# Patient Record
Sex: MALE | Race: WHITE | NOT HISPANIC OR LATINO | Employment: OTHER | URBAN - NONMETROPOLITAN AREA
[De-identification: names, ages, dates, MRNs, and addresses within clinical notes are randomized per-mention and may not be internally consistent; named-entity substitution may affect disease eponyms.]

---

## 2018-08-08 ENCOUNTER — HOSPITAL ENCOUNTER (EMERGENCY)
Facility: HOSPITAL | Age: 53
Discharge: ANOTHER HOSPITAL | End: 2018-08-08
Attending: PHYSICIAN ASSISTANT | Admitting: PHYSICIAN ASSISTANT
Payer: OTHER MISCELLANEOUS

## 2018-08-08 ENCOUNTER — APPOINTMENT (OUTPATIENT)
Dept: CT IMAGING | Facility: HOSPITAL | Age: 53
End: 2018-08-08
Attending: PHYSICIAN ASSISTANT
Payer: OTHER MISCELLANEOUS

## 2018-08-08 ENCOUNTER — APPOINTMENT (OUTPATIENT)
Dept: RADIOLOGY | Facility: HOSPITAL | Age: 53
End: 2018-08-08
Attending: PHYSICIAN ASSISTANT
Payer: OTHER MISCELLANEOUS

## 2018-08-08 VITALS
TEMPERATURE: 98.1 F | RESPIRATION RATE: 16 BRPM | BODY MASS INDEX: 30.39 KG/M2 | OXYGEN SATURATION: 92 % | HEIGHT: 73 IN | WEIGHT: 229.28 LBS | DIASTOLIC BLOOD PRESSURE: 75 MMHG | SYSTOLIC BLOOD PRESSURE: 136 MMHG | HEART RATE: 75 BPM

## 2018-08-08 DIAGNOSIS — S62.91XA FRACTURE OF RIGHT HAND: Primary | ICD-10-CM

## 2018-08-08 DIAGNOSIS — S63.054A DISLOCATION OF CARPOMETACARPAL JOINT OF RIGHT HAND, INITIAL ENCOUNTER: ICD-10-CM

## 2018-08-08 LAB
ANION GAP SERPL CALC-SCNC: 9 MMOL/L (ref 3–11)
BASOPHILS # BLD AUTO: 0.1 10*3/UL
BASOPHILS NFR BLD AUTO: 0 % (ref 0–2)
BUN SERPL-MCNC: 18 MG/DL (ref 7–25)
CALCIUM SERPL-MCNC: 9 MG/DL (ref 8.6–10.3)
CHLORIDE SERPL-SCNC: 103 MMOL/L (ref 98–107)
CO2 SERPL-SCNC: 27 MMOL/L (ref 21–32)
CREAT SERPL-MCNC: 1.1 MG/DL (ref 0.8–1.3)
EOSINOPHIL # BLD AUTO: 0.3 10*3/UL
EOSINOPHIL NFR BLD AUTO: 2 % (ref 0–3)
ERYTHROCYTE [DISTWIDTH] IN BLOOD BY AUTOMATED COUNT: 13.2 % (ref 11.5–15)
GFR SERPL CREATININE-BSD FRML MDRD: 70 ML/MIN/1.73M*2
GLUCOSE SERPL-MCNC: 124 MG/DL (ref 70–105)
HCT VFR BLD AUTO: 42 % (ref 38–50)
HGB BLD-MCNC: 14 G/DL (ref 13.2–17.2)
LYMPHOCYTES # BLD AUTO: 2.1 10*3/UL
LYMPHOCYTES NFR BLD AUTO: 14 % (ref 15–47)
MCH RBC QN AUTO: 29.9 PG (ref 29–34)
MCHC RBC AUTO-ENTMCNC: 33.3 G/DL (ref 32–36)
MCV RBC AUTO: 89.9 FL (ref 82–97)
MONOCYTES # BLD AUTO: 0.8 10*3/UL
MONOCYTES NFR BLD AUTO: 6 % (ref 5–13)
NEUTROPHILS # BLD AUTO: 11.6 10*3/UL
NEUTROPHILS NFR BLD AUTO: 78 % (ref 46–70)
PLATELET # BLD AUTO: 301 10*3/UL (ref 130–350)
PMV BLD AUTO: 7.4 FL (ref 6.9–10.8)
POTASSIUM SERPL-SCNC: 3.7 MMOL/L (ref 3.5–5.1)
RBC # BLD AUTO: 4.67 10*6/ΜL (ref 4.1–5.8)
SODIUM SERPL-SCNC: 139 MMOL/L (ref 135–145)
WBC # BLD AUTO: 14.8 10*3/UL (ref 3.7–9.6)

## 2018-08-08 PROCEDURE — 99284 EMERGENCY DEPT VISIT MOD MDM: CPT | Mod: GF | Performed by: PHYSICIAN ASSISTANT

## 2018-08-08 PROCEDURE — A0428 BLS: HCPCS | Mod: HH,QN

## 2018-08-08 PROCEDURE — 80048 BASIC METABOLIC PNL TOTAL CA: CPT | Performed by: PHYSICIAN ASSISTANT

## 2018-08-08 PROCEDURE — A0427 ALS1-EMERGENCY: HCPCS | Mod: QN,SH

## 2018-08-08 PROCEDURE — A0427 ALS1-EMERGENCY: HCPCS | Mod: SH,QN

## 2018-08-08 PROCEDURE — A0390 ADVANCED LIFE SUPPORT MILEAG: HCPCS | Mod: QN,SH

## 2018-08-08 PROCEDURE — A0390 ADVANCED LIFE SUPPORT MILEAG: HCPCS | Mod: SH,QN

## 2018-08-08 PROCEDURE — 85025 COMPLETE CBC W/AUTO DIFF WBC: CPT | Performed by: PHYSICIAN ASSISTANT

## 2018-08-08 PROCEDURE — 73110 X-RAY EXAM OF WRIST: CPT | Mod: RT

## 2018-08-08 PROCEDURE — 99284 EMERGENCY DEPT VISIT MOD MDM: CPT | Performed by: PHYSICIAN ASSISTANT

## 2018-08-08 PROCEDURE — 99285 EMERGENCY DEPT VISIT HI MDM: CPT | Performed by: PHYSICIAN ASSISTANT

## 2018-08-08 PROCEDURE — 6370000100 HC RX 637 (ALT 250 FOR IP): Performed by: PHYSICIAN ASSISTANT

## 2018-08-08 PROCEDURE — 36415 COLL VENOUS BLD VENIPUNCTURE: CPT | Performed by: PHYSICIAN ASSISTANT

## 2018-08-08 PROCEDURE — 2500000200 HC RX 250 WO HCPCS: Performed by: PHYSICIAN ASSISTANT

## 2018-08-08 PROCEDURE — 71045 X-RAY EXAM CHEST 1 VIEW: CPT

## 2018-08-08 PROCEDURE — 96376 TX/PRO/DX INJ SAME DRUG ADON: CPT

## 2018-08-08 PROCEDURE — 6360000200 HC RX 636 W HCPCS (ALT 250 FOR IP)

## 2018-08-08 PROCEDURE — 6360000200 HC RX 636 W HCPCS (ALT 250 FOR IP): Performed by: PHYSICIAN ASSISTANT

## 2018-08-08 PROCEDURE — 96374 THER/PROPH/DIAG INJ IV PUSH: CPT

## 2018-08-08 PROCEDURE — 2580000300 HC RX 258: Performed by: PHYSICIAN ASSISTANT

## 2018-08-08 PROCEDURE — 96375 TX/PRO/DX INJ NEW DRUG ADDON: CPT

## 2018-08-08 PROCEDURE — 70450 CT HEAD/BRAIN W/O DYE: CPT

## 2018-08-08 PROCEDURE — A0380 BASIC LIFE SUPPORT MILEAGE: HCPCS | Mod: HH,QN

## 2018-08-08 RX ORDER — BACITRACIN 500 [USP'U]/G
OINTMENT OPHTHALMIC
Status: DISCONTINUED
Start: 2018-08-08 | End: 2018-08-08 | Stop reason: WASHOUT

## 2018-08-08 RX ORDER — FENTANYL CITRATE/PF 50 MCG/ML
50 PLASTIC BAG, INJECTION (ML) INTRAVENOUS
Status: DISCONTINUED | OUTPATIENT
Start: 2018-08-08 | End: 2018-08-09 | Stop reason: HOSPADM

## 2018-08-08 RX ORDER — BACITRACIN ZINC 500 UNIT/G
OINTMENT (GRAM) TOPICAL
Status: DISCONTINUED
Start: 2018-08-08 | End: 2018-08-09 | Stop reason: HOSPADM

## 2018-08-08 RX ORDER — CYCLOBENZAPRINE HCL 10 MG
10 TABLET ORAL ONCE
Status: COMPLETED | OUTPATIENT
Start: 2018-08-08 | End: 2018-08-08

## 2018-08-08 RX ORDER — SODIUM CHLORIDE 9 MG/ML
1000 INJECTION, SOLUTION INTRAVENOUS ONCE
Status: COMPLETED | OUTPATIENT
Start: 2018-08-08 | End: 2018-08-08

## 2018-08-08 RX ORDER — BACITRACIN ZINC 500 UNIT/G
1 OINTMENT (GRAM) TOPICAL 2 TIMES DAILY
Status: DISCONTINUED | OUTPATIENT
Start: 2018-08-08 | End: 2018-08-09 | Stop reason: HOSPADM

## 2018-08-08 RX ORDER — CEFAZOLIN SODIUM 1 G/3ML
INJECTION, POWDER, FOR SOLUTION INTRAMUSCULAR; INTRAVENOUS
Status: COMPLETED
Start: 2018-08-08 | End: 2018-08-08

## 2018-08-08 RX ADMIN — SODIUM CHLORIDE 1000 ML: 9 INJECTION, SOLUTION INTRAVENOUS at 21:37

## 2018-08-08 RX ADMIN — SODIUM CHLORIDE 1000 ML: 9 INJECTION, SOLUTION INTRAVENOUS at 22:55

## 2018-08-08 RX ADMIN — FENTANYL CITRATE 50 MCG: 50 INJECTION, SOLUTION INTRAMUSCULAR; INTRAVENOUS at 21:33

## 2018-08-08 RX ADMIN — FENTANYL CITRATE 50 MCG: 50 INJECTION, SOLUTION INTRAMUSCULAR; INTRAVENOUS at 20:17

## 2018-08-08 RX ADMIN — Medication 1 APPLICATION: at 22:35

## 2018-08-08 RX ADMIN — CYCLOBENZAPRINE HYDROCHLORIDE 10 MG: 10 TABLET, FILM COATED ORAL at 22:21

## 2018-08-08 RX ADMIN — CEFAZOLIN SODIUM 2000 MG: 1 INJECTION, POWDER, FOR SOLUTION INTRAMUSCULAR; INTRAVENOUS at 22:29

## 2018-08-08 RX ADMIN — FENTANYL CITRATE 50 MCG: 50 INJECTION, SOLUTION INTRAMUSCULAR; INTRAVENOUS at 22:14

## 2018-08-08 ASSESSMENT — ENCOUNTER SYMPTOMS
CONSTITUTIONAL NEGATIVE: 1
TREMORS: 0
RESPIRATORY NEGATIVE: 1
ADENOPATHY: 0
CONFUSION: 1
CARDIOVASCULAR NEGATIVE: 1
EYES NEGATIVE: 1
HEADACHES: 0
WEAKNESS: 0
SPEECH DIFFICULTY: 0
DIZZINESS: 0
LIGHT-HEADEDNESS: 0
GASTROINTESTINAL NEGATIVE: 1

## 2018-08-09 ENCOUNTER — APPOINTMENT (OUTPATIENT)
Dept: FLUOROSCOPY | Facility: HOSPITAL | Age: 53
End: 2018-08-09
Attending: ORTHOPAEDIC SURGERY
Payer: OTHER MISCELLANEOUS

## 2018-08-09 ENCOUNTER — HOSPITAL ENCOUNTER (INPATIENT)
Facility: HOSPITAL | Age: 53
LOS: 1 days | Discharge: HOME | End: 2018-08-10
Attending: EMERGENCY MEDICINE | Admitting: ORTHOPAEDIC SURGERY
Payer: OTHER MISCELLANEOUS

## 2018-08-09 ENCOUNTER — ANESTHESIA EVENT (OUTPATIENT)
Dept: OPERATING ROOM | Facility: HOSPITAL | Age: 53
End: 2018-08-09
Payer: OTHER MISCELLANEOUS

## 2018-08-09 ENCOUNTER — ANESTHESIA (OUTPATIENT)
Dept: OPERATING ROOM | Facility: HOSPITAL | Age: 53
End: 2018-08-09
Payer: OTHER MISCELLANEOUS

## 2018-08-09 ENCOUNTER — APPOINTMENT (OUTPATIENT)
Dept: CT IMAGING | Facility: HOSPITAL | Age: 53
End: 2018-08-09
Payer: OTHER MISCELLANEOUS

## 2018-08-09 ENCOUNTER — APPOINTMENT (OUTPATIENT)
Dept: RADIOLOGY | Facility: HOSPITAL | Age: 53
End: 2018-08-09
Payer: OTHER MISCELLANEOUS

## 2018-08-09 DIAGNOSIS — S63.055D: ICD-10-CM

## 2018-08-09 DIAGNOSIS — G47.30 SLEEP APNEA, UNSPECIFIED TYPE: ICD-10-CM
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
## 2018-08-09 LAB
ABO GROUP (TYPE) IN BLOOD: NORMAL
AMPHET UR QL SCN: NORMAL
ANTIBODY SCREEN: NORMAL
BARBITURATES UR QL SCN: NORMAL
BENZODIAZ UR QL SCN: NORMAL
CANNABINOIDS UR QL SCN: NORMAL
COCAINE UR QL SCN: NORMAL
D AG BLD QL: NORMAL
ETHANOL SERPL-MCNC: <10 MG/DL (ref 0–10)
METHADONE UR QL SCN: NORMAL
METHAMPHET UR QL SCN: NORMAL
OPIATES UR QL SCN: NORMAL
OXYCODONE UR QL SCN: NORMAL
PCP UR QL SCN: NORMAL
PROPOXYPH UR QL SCN: NORMAL
TRICYCLICS UR QL SCN: NORMAL

## 2018-08-09 PROCEDURE — 73200 CT UPPER EXTREMITY W/O DYE: CPT | Mod: RT

## 2018-08-09 PROCEDURE — 80320 DRUG SCREEN QUANTALCOHOLS: CPT | Performed by: EMERGENCY MEDICINE

## 2018-08-09 PROCEDURE — 74177 CT ABD & PELVIS W/CONTRAST: CPT

## 2018-08-09 PROCEDURE — (BLANK) HC OR LEVEL 2 PROC 1ST 15MIN: Performed by: ORTHOPAEDIC SURGERY

## 2018-08-09 PROCEDURE — 6360000200 HC RX 636 W HCPCS (ALT 250 FOR IP): Performed by: EMERGENCY MEDICINE

## 2018-08-09 PROCEDURE — 96374 THER/PROPH/DIAG INJ IV PUSH: CPT

## 2018-08-09 PROCEDURE — 0PSP04Z REPOSITION RIGHT METACARPAL WITH INTERNAL FIXATION DEVICE, OPEN APPROACH: ICD-10-PCS | Performed by: ORTHOPAEDIC SURGERY

## 2018-08-09 PROCEDURE — (BLANK) HC OR LEVEL 2 PROC EACH ADDITIONAL MIN: Performed by: ORTHOPAEDIC SURGERY

## 2018-08-09 PROCEDURE — 99285 EMERGENCY DEPT VISIT HI MDM: CPT | Performed by: EMERGENCY MEDICINE

## 2018-08-09 PROCEDURE — 72128 CT CHEST SPINE W/O DYE: CPT

## 2018-08-09 PROCEDURE — 2590000100 HC RX 259: Performed by: ORTHOPAEDIC SURGERY

## 2018-08-09 PROCEDURE — 99285 EMERGENCY DEPT VISIT HI MDM: CPT

## 2018-08-09 PROCEDURE — C1713 ANCHOR/SCREW BN/BN,TIS/BN: HCPCS | Performed by: ORTHOPAEDIC SURGERY

## 2018-08-09 PROCEDURE — 76000 FLUOROSCOPY <1 HR PHYS/QHP: CPT | Performed by: ORTHOPAEDIC SURGERY

## 2018-08-09 PROCEDURE — 72125 CT NECK SPINE W/O DYE: CPT

## 2018-08-09 PROCEDURE — (BLANK) HC RECOVERY PHASE-1 1ST  HOUR ACUITY LEVEL 2: Performed by: ORTHOPAEDIC SURGERY

## 2018-08-09 PROCEDURE — 6360000200 HC RX 636 W HCPCS (ALT 250 FOR IP): Performed by: NURSE ANESTHETIST, CERTIFIED REGISTERED

## 2018-08-09 PROCEDURE — 6360000200 HC RX 636 W HCPCS (ALT 250 FOR IP): Performed by: ORTHOPAEDIC SURGERY

## 2018-08-09 PROCEDURE — 2580000300 HC RX 258: Performed by: ORTHOPAEDIC SURGERY

## 2018-08-09 PROCEDURE — 26615 TREAT METACARPAL FRACTURE: CPT | Performed by: ORTHOPAEDIC SURGERY

## 2018-08-09 PROCEDURE — 99221 1ST HOSP IP/OBS SF/LOW 40: CPT | Mod: 57,AI | Performed by: ORTHOPAEDIC SURGERY

## 2018-08-09 PROCEDURE — 2580000300 HC RX 258: Performed by: EMERGENCY MEDICINE

## 2018-08-09 PROCEDURE — 73100 X-RAY EXAM OF WRIST: CPT | Mod: RT

## 2018-08-09 PROCEDURE — (BLANK) HC ROOM PRIVATE

## 2018-08-09 PROCEDURE — 2500000200 HC RX 250 WO HCPCS: Performed by: EMERGENCY MEDICINE

## 2018-08-09 PROCEDURE — 01830 ANES ARTHR/NDSC WRST/HND NOS: CPT | Performed by: NURSE ANESTHETIST, CERTIFIED REGISTERED

## 2018-08-09 PROCEDURE — 2580000300 HC RX 258: Performed by: ANESTHESIOLOGY

## 2018-08-09 PROCEDURE — 80306 DRUG TEST PRSMV INSTRMNT: CPT | Performed by: EMERGENCY MEDICINE

## 2018-08-09 PROCEDURE — 94660 CPAP INITIATION&MGMT: CPT

## 2018-08-09 PROCEDURE — 2500000200 HC RX 250 WO HCPCS: Performed by: ORTHOPAEDIC SURGERY

## 2018-08-09 PROCEDURE — 36416 COLLJ CAPILLARY BLOOD SPEC: CPT | Performed by: EMERGENCY MEDICINE

## 2018-08-09 PROCEDURE — 36415 COLL VENOUS BLD VENIPUNCTURE: CPT | Performed by: EMERGENCY MEDICINE

## 2018-08-09 PROCEDURE — 2550000100 HC RX 255: Mod: JW | Performed by: EMERGENCY MEDICINE

## 2018-08-09 PROCEDURE — 26670 TREAT HAND DISLOCATION: CPT | Performed by: EMERGENCY MEDICINE

## 2018-08-09 PROCEDURE — 0PSM04Z REPOSITION RIGHT CARPAL WITH INTERNAL FIXATION DEVICE, OPEN APPROACH: ICD-10-PCS | Performed by: ORTHOPAEDIC SURGERY

## 2018-08-09 PROCEDURE — 12001 RPR S/N/AX/GEN/TRNK 2.5CM/<: CPT | Performed by: EMERGENCY MEDICINE

## 2018-08-09 PROCEDURE — 86885 COOMBS TEST INDIRECT QUAL: CPT | Performed by: EMERGENCY MEDICINE

## 2018-08-09 DEVICE — IMPLANTABLE DEVICE: Type: IMPLANTABLE DEVICE | Site: HAND | Status: FUNCTIONAL

## 2018-08-09 RX ORDER — DIPHENHYDRAMINE HYDROCHLORIDE 50 MG/ML
25 INJECTION INTRAMUSCULAR; INTRAVENOUS ONCE AS NEEDED
Status: DISCONTINUED | OUTPATIENT
Start: 2018-08-09 | End: 2018-08-09 | Stop reason: HOSPADM

## 2018-08-09 RX ORDER — SODIUM CHLORIDE 9 MG/ML
100 INJECTION, SOLUTION INTRAVENOUS CONTINUOUS
Status: DISCONTINUED | OUTPATIENT
Start: 2018-08-09 | End: 2018-08-10

## 2018-08-09 RX ORDER — SODIUM CHLORIDE, SODIUM LACTATE, POTASSIUM CHLORIDE, CALCIUM CHLORIDE 600; 310; 30; 20 MG/100ML; MG/100ML; MG/100ML; MG/100ML
100 INJECTION, SOLUTION INTRAVENOUS CONTINUOUS
Status: DISCONTINUED | OUTPATIENT
Start: 2018-08-09 | End: 2018-08-09 | Stop reason: HOSPADM

## 2018-08-09 RX ORDER — MIDAZOLAM HYDROCHLORIDE 1 MG/ML
1 INJECTION INTRAMUSCULAR; INTRAVENOUS EVERY 5 MIN PRN
Status: DISCONTINUED | OUTPATIENT
Start: 2018-08-09 | End: 2018-08-09 | Stop reason: HOSPADM

## 2018-08-09 RX ORDER — FENTANYL CITRATE/PF 50 MCG/ML
50 PLASTIC BAG, INJECTION (ML) INTRAVENOUS
Status: DISCONTINUED | OUTPATIENT
Start: 2018-08-09 | End: 2018-08-09 | Stop reason: HOSPADM

## 2018-08-09 RX ORDER — DOCUSATE SODIUM 100 MG/1
100 CAPSULE, LIQUID FILLED ORAL 2 TIMES DAILY
Status: DISCONTINUED | OUTPATIENT
Start: 2018-08-09 | End: 2018-08-10 | Stop reason: HOSPADM

## 2018-08-09 RX ORDER — ONDANSETRON 4 MG/1
4 TABLET, FILM COATED ORAL EVERY 6 HOURS PRN
Status: DISCONTINUED | OUTPATIENT
Start: 2018-08-09 | End: 2018-08-10 | Stop reason: HOSPADM

## 2018-08-09 RX ORDER — SODIUM CHLORIDE 9 MG/ML
1000 INJECTION, SOLUTION INTRAVENOUS ONCE
Status: COMPLETED | OUTPATIENT
Start: 2018-08-09 | End: 2018-08-09

## 2018-08-09 RX ORDER — MORPHINE SULFATE 4 MG/ML
3-4 INJECTION, SOLUTION INTRAMUSCULAR; INTRAVENOUS
Status: DISCONTINUED | OUTPATIENT
Start: 2018-08-09 | End: 2018-08-10

## 2018-08-09 RX ORDER — PROPOFOL 10 MG/ML
INJECTION, EMULSION INTRAVENOUS AS NEEDED
Status: DISCONTINUED | OUTPATIENT
Start: 2018-08-09 | End: 2018-08-09 | Stop reason: SURG

## 2018-08-09 RX ORDER — MORPHINE SULFATE 4 MG/ML
4 INJECTION, SOLUTION INTRAMUSCULAR; INTRAVENOUS ONCE
Status: COMPLETED | OUTPATIENT
Start: 2018-08-09 | End: 2018-08-09

## 2018-08-09 RX ORDER — ACETAMINOPHEN 325 MG/1
325-650 TABLET ORAL EVERY 4 HOURS PRN
Status: DISCONTINUED | OUTPATIENT
Start: 2018-08-09 | End: 2018-08-10 | Stop reason: HOSPADM

## 2018-08-09 RX ORDER — IOPAMIDOL 755 MG/ML
130 INJECTION, SOLUTION INTRAVASCULAR ONCE
Status: COMPLETED | OUTPATIENT
Start: 2018-08-09 | End: 2018-08-09

## 2018-08-09 RX ORDER — LABETALOL HYDROCHLORIDE 5 MG/ML
10 INJECTION, SOLUTION INTRAVENOUS EVERY 5 MIN PRN
Status: DISCONTINUED | OUTPATIENT
Start: 2018-08-09 | End: 2018-08-09 | Stop reason: HOSPADM

## 2018-08-09 RX ORDER — KETOROLAC TROMETHAMINE 30 MG/ML
30 INJECTION, SOLUTION INTRAMUSCULAR; INTRAVENOUS EVERY 6 HOURS SCHEDULED
Status: DISCONTINUED | OUTPATIENT
Start: 2018-08-09 | End: 2018-08-10

## 2018-08-09 RX ORDER — PROPOFOL 10 MG/ML
INJECTION, EMULSION INTRAVENOUS CODE/TRAUMA/SEDATION MEDICATION
Status: COMPLETED | OUTPATIENT
Start: 2018-08-09 | End: 2018-08-09

## 2018-08-09 RX ORDER — ONDANSETRON HYDROCHLORIDE 2 MG/ML
4 INJECTION, SOLUTION INTRAVENOUS ONCE AS NEEDED
Status: DISCONTINUED | OUTPATIENT
Start: 2018-08-09 | End: 2018-08-09 | Stop reason: HOSPADM

## 2018-08-09 RX ORDER — HYDROMORPHONE HYDROCHLORIDE 1 MG/ML
0.5 INJECTION, SOLUTION INTRAMUSCULAR; INTRAVENOUS; SUBCUTANEOUS EVERY 5 MIN PRN
Status: DISCONTINUED | OUTPATIENT
Start: 2018-08-09 | End: 2018-08-09 | Stop reason: HOSPADM

## 2018-08-09 RX ORDER — OXYCODONE HYDROCHLORIDE 5 MG/1
5-10 TABLET ORAL EVERY 4 HOURS PRN
Status: DISCONTINUED | OUTPATIENT
Start: 2018-08-09 | End: 2018-08-10 | Stop reason: HOSPADM

## 2018-08-09 RX ORDER — BUPIVACAINE HYDROCHLORIDE 5 MG/ML
INJECTION, SOLUTION EPIDURAL; INTRACAUDAL AS NEEDED
Status: DISCONTINUED | OUTPATIENT
Start: 2018-08-09 | End: 2018-08-09 | Stop reason: HOSPADM

## 2018-08-09 RX ORDER — OXYCODONE HYDROCHLORIDE 5 MG/1
10 TABLET ORAL EVERY 4 HOURS PRN
Status: DISCONTINUED | OUTPATIENT
Start: 2018-08-09 | End: 2018-08-10 | Stop reason: HOSPADM

## 2018-08-09 RX ORDER — LIDOCAINE HYDROCHLORIDE 20 MG/ML
INJECTION, SOLUTION EPIDURAL; INFILTRATION; INTRACAUDAL; PERINEURAL AS NEEDED
Status: DISCONTINUED | OUTPATIENT
Start: 2018-08-09 | End: 2018-08-09 | Stop reason: SURG

## 2018-08-09 RX ORDER — KETOROLAC TROMETHAMINE 30 MG/ML
INJECTION, SOLUTION INTRAMUSCULAR; INTRAVENOUS AS NEEDED
Status: DISCONTINUED | OUTPATIENT
Start: 2018-08-09 | End: 2018-08-09 | Stop reason: SURG

## 2018-08-09 RX ORDER — ADHESIVE BANDAGE
30 BANDAGE TOPICAL DAILY PRN
Status: DISCONTINUED | OUTPATIENT
Start: 2018-08-09 | End: 2018-08-10 | Stop reason: HOSPADM

## 2018-08-09 RX ORDER — CALCIUM CARBONATE 200(500)MG
500 TABLET,CHEWABLE ORAL AS NEEDED
Status: DISCONTINUED | OUTPATIENT
Start: 2018-08-09 | End: 2018-08-10 | Stop reason: HOSPADM

## 2018-08-09 RX ORDER — CEFAZOLIN SODIUM 10 G/1
2000 INJECTION, POWDER, FOR SOLUTION INTRAVENOUS ONCE
Status: COMPLETED | OUTPATIENT
Start: 2018-08-09 | End: 2018-08-09

## 2018-08-09 RX ORDER — CEFAZOLIN SODIUM 10 G/1
2000 INJECTION, POWDER, FOR SOLUTION INTRAVENOUS EVERY 8 HOURS SCHEDULED
Status: DISCONTINUED | OUTPATIENT
Start: 2018-08-09 | End: 2018-08-10

## 2018-08-09 RX ORDER — ONDANSETRON HYDROCHLORIDE 2 MG/ML
4 INJECTION, SOLUTION INTRAVENOUS EVERY 6 HOURS PRN
Status: DISCONTINUED | OUTPATIENT
Start: 2018-08-09 | End: 2018-08-10

## 2018-08-09 RX ORDER — PROMETHAZINE HYDROCHLORIDE 25 MG/ML
12.5 INJECTION, SOLUTION INTRAMUSCULAR; INTRAVENOUS ONCE AS NEEDED
Status: DISCONTINUED | OUTPATIENT
Start: 2018-08-09 | End: 2018-08-09 | Stop reason: HOSPADM

## 2018-08-09 RX ORDER — FENTANYL CITRATE/PF 50 MCG/ML
50 PLASTIC BAG, INJECTION (ML) INTRAVENOUS EVERY 5 MIN PRN
Status: DISCONTINUED | OUTPATIENT
Start: 2018-08-09 | End: 2018-08-09 | Stop reason: HOSPADM

## 2018-08-09 RX ORDER — FENTANYL CITRATE/PF 50 MCG/ML
PLASTIC BAG, INJECTION (ML) INTRAVENOUS AS NEEDED
Status: DISCONTINUED | OUTPATIENT
Start: 2018-08-09 | End: 2018-08-09 | Stop reason: SURG

## 2018-08-09 RX ORDER — PROPOFOL 10 MG/ML
INJECTION, EMULSION INTRAVENOUS
Status: DISCONTINUED
Start: 2018-08-09 | End: 2018-08-10 | Stop reason: HOSPADM

## 2018-08-09 RX ORDER — ONDANSETRON HYDROCHLORIDE 2 MG/ML
INJECTION, SOLUTION INTRAVENOUS AS NEEDED
Status: DISCONTINUED | OUTPATIENT
Start: 2018-08-09 | End: 2018-08-09 | Stop reason: SURG

## 2018-08-09 RX ADMIN — FENTANYL CITRATE 50 MCG: 50 INJECTION, SOLUTION INTRAMUSCULAR; INTRAVENOUS at 10:43

## 2018-08-09 RX ADMIN — SODIUM CHLORIDE 100 ML/HR: 9 INJECTION, SOLUTION INTRAVENOUS at 13:36

## 2018-08-09 RX ADMIN — KETOROLAC TROMETHAMINE 30 MG: 30 INJECTION, SOLUTION INTRAMUSCULAR at 10:49

## 2018-08-09 RX ADMIN — PROPOFOL 180 MG: 10 INJECTION, EMULSION INTRAVENOUS at 09:35

## 2018-08-09 RX ADMIN — LIDOCAINE HYDROCHLORIDE 60 MG: 20 INJECTION, SOLUTION EPIDURAL; INFILTRATION; INTRACAUDAL; PERINEURAL at 09:35

## 2018-08-09 RX ADMIN — FENTANYL CITRATE 50 MCG: 50 INJECTION, SOLUTION INTRAMUSCULAR; INTRAVENOUS at 09:52

## 2018-08-09 RX ADMIN — CEFAZOLIN SODIUM 2000 MG: 10 POWDER, FOR SOLUTION INTRAVENOUS at 13:30

## 2018-08-09 RX ADMIN — SODIUM CHLORIDE, POTASSIUM CHLORIDE, SODIUM LACTATE AND CALCIUM CHLORIDE: 600; 310; 30; 20 INJECTION, SOLUTION INTRAVENOUS at 10:39

## 2018-08-09 RX ADMIN — SODIUM CHLORIDE, POTASSIUM CHLORIDE, SODIUM LACTATE AND CALCIUM CHLORIDE: 600; 310; 30; 20 INJECTION, SOLUTION INTRAVENOUS at 09:28

## 2018-08-09 RX ADMIN — WATER 2000 MG: 100 INJECTION, SOLUTION INTRAVENOUS at 09:40

## 2018-08-09 RX ADMIN — PROPOFOL 80 MG: 10 INJECTION, EMULSION INTRAVENOUS at 02:10

## 2018-08-09 RX ADMIN — Medication 100 MCG: at 10:13

## 2018-08-09 RX ADMIN — KETOROLAC TROMETHAMINE 30 MG: 30 INJECTION, SOLUTION INTRAMUSCULAR at 23:40

## 2018-08-09 RX ADMIN — IOPAMIDOL 130 ML: 755 INJECTION, SOLUTION INTRAVENOUS at 01:10

## 2018-08-09 RX ADMIN — MORPHINE SULFATE 4 MG: 4 INJECTION, SOLUTION INTRAMUSCULAR; INTRAVENOUS at 00:20

## 2018-08-09 RX ADMIN — FENTANYL CITRATE 25 MCG: 50 INJECTION, SOLUTION INTRAMUSCULAR; INTRAVENOUS at 10:23

## 2018-08-09 RX ADMIN — KETOROLAC TROMETHAMINE 30 MG: 30 INJECTION, SOLUTION INTRAMUSCULAR at 13:27

## 2018-08-09 RX ADMIN — ONDANSETRON 4 MG: 2 INJECTION INTRAMUSCULAR; INTRAVENOUS at 10:34

## 2018-08-09 RX ADMIN — PROPOFOL 20 MG: 10 INJECTION, EMULSION INTRAVENOUS at 09:51

## 2018-08-09 RX ADMIN — FENTANYL CITRATE 50 MCG: 50 INJECTION, SOLUTION INTRAMUSCULAR; INTRAVENOUS at 09:50

## 2018-08-09 RX ADMIN — FENTANYL CITRATE 50 MCG: 50 INJECTION, SOLUTION INTRAMUSCULAR; INTRAVENOUS at 09:35

## 2018-08-09 RX ADMIN — SODIUM CHLORIDE 1000 ML: 9 INJECTION, SOLUTION INTRAVENOUS at 00:30

## 2018-08-09 RX ADMIN — CEFAZOLIN SODIUM 2000 MG: 10 POWDER, FOR SOLUTION INTRAVENOUS at 22:18

## 2018-08-09 RX ADMIN — SODIUM CHLORIDE 100 ML/HR: 9 INJECTION, SOLUTION INTRAVENOUS at 23:41

## 2018-08-09 RX ADMIN — KETOROLAC TROMETHAMINE 30 MG: 30 INJECTION, SOLUTION INTRAMUSCULAR at 18:03

## 2018-08-09 RX ADMIN — FENTANYL CITRATE 25 MCG: 50 INJECTION, SOLUTION INTRAMUSCULAR; INTRAVENOUS at 10:31

## 2018-08-09 ASSESSMENT — ENCOUNTER SYMPTOMS
NECK PAIN: 0
SORE THROAT: 0
ABDOMINAL PAIN: 0
COUGH: 0
SHORTNESS OF BREATH: 0
NAUSEA: 0
DYSURIA: 0
CHILLS: 0
FEVER: 0
DIARRHEA: 0
EYE PAIN: 0
HEADACHES: 0
RHINORRHEA: 0
BACK PAIN: 1
ARTHRALGIAS: 1
VOMITING: 0

## 2018-08-09 ASSESSMENT — ACTIVITIES OF DAILY LIVING (ADL)
PATIENT'S MEMORY ADEQUATE TO SAFELY COMPLETE DAILY ACTIVITIES?: YES
ADEQUATE_TO_COMPLETE_ADL: YES

## 2018-08-09 NOTE — ANESTHESIA PROCEDURE NOTES
Airway  Urgency: elective    Airway not difficult    General Information and Staff    Patient location during procedure: OR  Anesthesiologist: HALIMA ALVAREZ  CRNA: CURT MUNIZ  Performed: CRNA     Indications and Patient Condition  Indications for airway management: anesthesia  Spontaneous Ventilation: absent  Sedation level: deep  Preoxygenated: yes  MILS maintained throughout  Mask difficulty assessment: 1 - vent by mask    Final Airway Details  Final airway type: supraglottic airway      Successful airway: unique  Size 5    Placement verified by: chest auscultation, capnometry and palpation of cuff   Number of attempts at approach: 1    Additional Comments  Oral structures unchanged  Inserted by medical student  Inflated to minimal occlusive pressure

## 2018-08-09 NOTE — PERIOPERATIVE NURSING NOTE
[id number] paged dr. Resendiz per tele web    Patient o2 down to 84 on ra   He wears cpap starting at 14 and it goes down to 4
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
## 2018-08-09 NOTE — ANESTHESIA PREPROCEDURE EVALUATION
"Pre-Procedure Assessment    Patient: Angus Baldwin, male, 53 y.o.    Ht Readings from Last 1 Encounters:   08/09/18 1.873 m (6' 1.75\")     Wt Readings from Last 1 Encounters:   08/09/18 99.8 kg (220 lb)       Last Vitals  /85 (08/09/18 0829)    Temp 36.9 °C (98.4 °F) (08/09/18 0829)    Pulse 80 (08/09/18 0829)   Resp 18 (08/09/18 0829)    SpO2 93 % (08/09/18 0829)    Pain Score         Problem list reviewed and Medical history reviewed           Airway   Mallampati: III  TM distance: >3 FB  Neck ROM: full      Dental      Pulmonary     breath sounds clear to auscultation  (+) sleep apnea,   Cardiovascular     Rhythm: regular  Rate: normal    Mental Status/Neuro/Psych    Pt is alert.        GI/Hepatic/Renal      Endo/Other    Abdominal           Social History   Substance Use Topics   • Smoking status: Never Smoker   • Smokeless tobacco: Never Used   • Alcohol use Yes      Hematology   Lab Results   Component Value Date/Time    WBC 14.8 (H) 08/08/2018 08:50 PM    RBC 4.67 08/08/2018 08:50 PM    MCV 89.9 08/08/2018 08:50 PM    HGB 14.0 08/08/2018 08:50 PM    HCT 42.0 08/08/2018 08:50 PM     08/08/2018 08:50 PM      Coagulation No results found for: PT, APTT, INR   General Chemistry   Lab Results   Component Value Date/Time    CALCIUM 9.0 08/08/2018 08:50 PM    BUN 18 08/08/2018 08:50 PM    CREATININE 1.1 08/08/2018 08:50 PM    GLUCOSE 124 (H) 08/08/2018 08:50 PM     08/08/2018 08:50 PM    K 3.7 08/08/2018 08:50 PM    CO2 27 08/08/2018 08:50 PM     08/08/2018 08:50 PM     Anesthesia Plan    ASA 2   NPO status reviewed: > 6 hours    General         Induction: intravenous   Airway Planning: LMA              Anesthetic plan and risks discussed with patient.      Plan discussed with CRNA.              "

## 2018-08-09 NOTE — MEDICATION HISTORY SPECIALIST NOTES
CSN: [id number]  : [id number]  Patient reports no home medications. Allergies verified. Denies OTC/Prescription medications.

## 2018-08-09 NOTE — BRIEF OP NOTE
Brief Op Note:    Angus Baldwin  8/9/2018    * No Diagnosis Codes entered *     Fracture dislocation right second third fourth and fifth carpometacarpal joints  * No Diagnosis Codes entered *  Same  Procedures:    * OPEN REDUCTION INTERNAL FIXATION HAND FOR MULTIPLE FRACTURE DISLOCATIONS    Surgeon(s):  Clay Dow MD    Anesthesia:  Anesthesiologist: Malik Resendiz MD  CRNA: Irvin Denson CRNA  General      Staff:   Circulator: Cynthia Altman RN  Relief Circulator: Rachel Bradshaw RN  Scrub Person: Aric Vizcaino    Estimated Blood Loss:  No blood loss documented.    Specimens:  * No specimens in log *      Drains:       Complications:  None    Clay Dow MD  Phone Number: [phone]    Date: 8/9/2018  Time: 11:03 AM

## 2018-08-09 NOTE — OP NOTE
Angus Baldwin  08/09/18    PREOPERATIVE DIAGNOSIS:  * No Diagnosis Codes entered *  Fracture dislocation right second third fourth and fifth carpometacarpal joints  POSTOPERATIVE DIAGNOSIS:  * No Diagnosis Codes entered *  Same  PROCEDURES:    Procedures:    * OPEN REDUCTION INTERNAL FIXATION HAND FOR MULTIPLE FRACTURE DISLOCATIONS      SURGEON: Surgeon(s):  Clay Dow MD      ASSISTANT: None      ANESTHETIST:  Anesthesiologist: Malik Resendiz MD  CRNA: Irvin Denson CRNA       ANESTHESIA TYPE:  General       ESTIMATED BLOOD LOSS:  No blood loss documented.    URINE OUTPUT:  None recorded    IV FLUIDS:  Please see anesthesia notes.    SPECIMENS: No specimens collected during this procedure.    DRAINS:       IMPLANTS:    Implant Name Type Inv. Item Serial No.  Lot No. LRB No. Used   kwire .[id number]     3 218 2 Right 3   kwire .045     3 21 82 Right 2   SCREW TI SELFTAPPIN 2.0 X 24MM W/CRUCIFORM - [id number] Screw SCREW TI SELFTAPPIN 2.0 X 24MM W/CRUCIFORM   SYNTHES 1 212 6 Right 1       TOURNIQUET: Right upper arm at 250 mmHg      COMPLICATIONS: None      FINDINGS: Fracture dislocation right second third fourth and fifth carpometacarpal joints with comminuted fracture of trapezoid oblique fracture of capitate and hamate    DESCRIPTION OF PROCEDURE:    Preparation: In the holding area the right upper extremity was marked confirming the operative site with the patient.  He was then taken to the operating room placed supine in the operating table and given adequate general endotracheal anesthetic.  Pneumatic tourniquet was placed on the right upper arm.  Previously placed splint was then removed.  Right upper extremity was then prepped and draped in sterile fashion using Betadine scrub and solution patient was given 2 g of Ancef IV preoperatively.  Did have 2 lacerations one over the metacarpal head of the third metacarpal and of the fourth metacarpal is been previously sutured in the ER.  There were no open  wounds over the area of injury.  A timeout was then taken confirming patient and operative site.  The arm was then exsanguinated and tourniquet placed to 250 mmHg    Procedure: Longitudinal incision was then made over the dorsal aspect of the right hand on the ulnar aspect of the second ray.  It was carried down through skin and subcutaneous tissue and bleeders were cauterized.  Blunt dissection was then carried deeply.  Once opened the interval and the second compartment elevated the extensor carpi radialis longus and brevis this revealed the radial aspect of the injury.  There is marked overriding of the second third metacarpals on the carpus.  Longitudinal traction was then applied.  I had to subperiosteally dissected over the fracture sites.  At that point then the fracture of the trapezoid was identified it was actually into small pieces is not amenable to screw fixation.  I used to 0.045 inch K wires to stabilize these going through the base of the second metacarpal.  I then placed a 6 2 K wire from the third metacarpal into the carpus while maintaining traction on the digit allowed me to then align the fractured capitate.  I then placed a 2 mm Synthes screw using lag technique to the capitate securing this with excellent approximation.  That point images showed fairly good reduction and alignment.  Then manipulated the fourth and fifth metacarpal dislocations and applied a 0.062 inch K wire securing the hamate from ulnar to radial into the carpus.  A final 0.062 inch K wire was then used through the fifth metacarpal into the carpus.  At that point excellent stability was obtained.  Pin balls were then applied pins were cut short.  The wounds were then irrigated with normal saline and then approximated using 2-0 Vicryl in the capsule and 4-0 nylon interrupted mattress in the skin.  And infiltrated with half percent Marcaine plain using a total of 20 cc.  Sterile dressing with multiple layers of soft roll and a  volar splint with the wrist in slight dorsiflexion was then applied.  Patient was then awakened extubated and taken to recovery in stable condition.  Sponge and needle counts reported correct.  Complications were none.  DISPOSITION:  MAIN OR/NONE    Clay Dow MD  Phone Number: [phone]    DATE: 08/09/18      TIME: 11:04 AM

## 2018-08-09 NOTE — INTERDISCIPLINARY/THERAPY
SW met with pt and visitor briefly.  With pt's permission, SW contacted the [employer] volunteers to meet with pt and offer assistance.  SW available should future needs arise.

## 2018-08-09 NOTE — PLAN OF CARE
Problem: Musculoskeletal - Adult  Goal: Maintain proper alignment of affected body part  INTERVENTIONS:  1. Support and protect limb and body alignment per provider's orders  2. Instruct and reinforce with patient and family use of appropriate assistive device and precautions (e.g. spinal or hip dislocation precautions)   Outcome: Progressing   08/09/18 1450   Interventions Appropriate for this Patient   Maintain proper alignment of affected body part Support and protect limb and body alignment per provider's orders     Goal: Return ADL status to a safe level of function  INTERVENTIONS:  1. Assess patient's ADL deficits and provide assistive devices as needed  2. Obtain PT/OT consults as needed  3. Assist and instruct patient to increase activity and self care   Outcome: Progressing   08/09/18 1450   Interventions Appropriate for this Patient   Return activities of daily living status to a safe level of function Assess patient's activities of daily living deficits and provide assistive devices as needed;Assist and instruct patient to increase activity and self care       Problem: Skin/Tissue Integrity - Adult  Goal: Skin integrity remains intact  INTERVENTIONS  1. Assess and document risk factors for pressure ulcer development  2. Assess and document skin integrity  3. Monitor for areas of redness and/or skin breakdown  4. Initiate pressure ulcer prevention measures as indicated   Outcome: Progressing   08/09/18 1450   Interventions Appropriate for this Patient   Skin integrity remains intact Assess and document risk factors for pressure ulcer development;Assess and document skin integrity;Monitor for areas of redness and/or skin breakdown     Goal: Incisions, wounds, or drain sites healing without S/S of infection  INTERVENTIONS  1. Assess and document risk factors for skin breakdown  2. Assess and document skin integrity  3. Assess and document dressing/incision, wound bed, drain sites and surrounding tissue  4.  Implement wound care per orders   Outcome: Progressing   08/09/18 1450   Interventions Appropriate for this Patient   Incision(s), wound(s) or drain site(s) healing without S/S of infection Assess and document risk factors for skin breakdown;Assess and document skin integrity;Assess and document dressing/incision, wound bed, drain sites and surrounding tissue       Problem: Pain - Adult  Goal: Verbalizes/displays adequate comfort level or baseline comfort level  INTERVENTIONS:  1. Encourage patient to monitor pain and request interventions  2. Assess pain using the appropriate pain scale  3. Administer analgesics based on type and severity of pain and evaluate response  4. Educate/Implement non-pharmacological measures as appropriate and evaluate response  5. Consider cultural, developmental and social influences on pain and pain management  6. Notify Provider if interventions unsuccessful or patient reports new pain   Outcome: Progressing   08/09/18 1450   Interventions Appropriate for this Patient   Verbalizes/displays adequate comfort level or baseline comfort level Encourage patient to monitor pain and request interventions;Assess pain using the appropriate pain scale;Administer analgesics based on type and severity of pain and evaluate response;Consider cultural, developmental and social influences on pain and pain management;Educate/Implement non-pharmacological measures as appropriate and evaluate response       Problem: Safety Adult  Goal: Patient will remain safe during hospitalization  INTERVENTIONS    1. Assess patient for fall risk and implement interventions if needed  2. Use safe transport techniques  3. Assess patient using the Ralph skin assessment scale  4. Assess patient for risk of aspiration  5. Assess patient for risk of elopement   Outcome: Progressing   08/09/18 1450   Interventions Appropriate for this Patient   Patient will remain safe durning hospitalization Assess patient for Fall  Risk;Use safe transport;Assess Patient using the Ralph scale;Assess Patient for Aspirations

## 2018-08-09 NOTE — ED PROCEDURE NOTE
Procedure  Procedural Sedation  Date/Time: 8/9/2018 2:00 AM  Performed by: JULIETA GAN  Authorized by: JULIETA GAN     Consent:     Consent obtained:  Verbal and written/electronic    Consent given by:  Patient    Anesthetic risks, benefits, and alternatives explained to the patient: Yes    Indications:     Procedure performed:  Fracture reduction    Procedure Performed by:  Different physician (Dr. Pan)    Intended level of sedation:  Deep  Pre-sedation assessment:     Time since last food or drink:  8+ hours ago    ASA classification: class 2 - patient with mild systemic disease      Neck mobility:  Full    Mouth opening:  3 or more finger widths    Mallampati score:  I - soft palate, uvula, fauces, pillars visible    Pre-sedation assessments completed and reviewed: airway patency, cardiovascular function, hydration status, mental status, nausea/vomiting and pain level      History of difficult intubation: no      Pre-sedation assessment completed:  8/9/2018 1:50 AM  Immediate pre-procedure details:     Reassessment: Patient reassessed immediately prior to procedure      Reviewed: vital signs, relevant labs/tests and NPO status    Procedure details (see MAR for exact dosages):     Sedation start time:  8/9/2018 2:00 AM    Preoxygenation:  Nonrebreather mask    Medications:  Propofol    Intra-procedure monitoring:  Blood pressure monitoring, continuous capnometry, continuous pulse oximetry and EKG    Intra-procedure events: none      Sedation end time:  8/9/2018 2:22 AM    Total sedation time (minutes):  22  Post-procedure details:     Post-sedation assessment completed:  8/9/2018 2:22 AM    Attendance: Constant attendance by certified staff until patient recovered      Recovery: Patient returned to pre-procedure baseline      Cardiovascular Status:  Hemodynamically stable and acceptable    Respiratory Status:  Acceptable    Postoperative Hydration:  Acceptable    Patient is stable for discharge or admission: yes       Patient tolerance:  Tolerated well, no immediate complications              By signing my name, I, Jamila Cortez, attest that this documentation has been prepared under the direction and in the presence of Dr. Trejo, 8/9/2018, 2:22 AM.                  Margarito Trejo MD  08/09/18 0222

## 2018-08-09 NOTE — ANESTHESIA POSTPROCEDURE EVALUATION
Patient: Angus Baldwin    Procedure Summary     Date:  08/09/18 Room / Location:  [facility] OR 03 / [facility] OR    Anesthesia Start:  0928 Anesthesia Stop:  1106    Procedure:  OPEN REDUCTION INTERNAL FIXATION HAND FOR MULTIPLE FRACTURE DISLOCATIONS (Right Hand) Diagnosis:      Surgeon:  Clay Dow MD Responsible Provider:  Malik Resendiz MD    Anesthesia Type:  general ASA Status:  2          Anesthesia Type: general  Last vitals  /63 (08/09/18 1115)    Temp 37.5 °C (99.5 °F) (08/09/18 1100)    Pulse 81 (08/09/18 1115)   Resp 14 (08/09/18 1115)    SpO2 93 % (08/09/18 1115)    Pain Score        Anesthesia Post Evaluation    Patient location during evaluation: PACU  Patient participation: complete - patient participated  Level of consciousness: awake and alert  Pain management: adequate  Airway patency: patent  Anesthetic complications: no  Cardiovascular status: acceptable  Respiratory status: acceptable  Hydration status: acceptable  May dismiss recovered patient based on consultation with the appropriate physicians and/or meeting appropriate discharge criteria

## 2018-08-10 VITALS
OXYGEN SATURATION: 92 % | DIASTOLIC BLOOD PRESSURE: 73 MMHG | SYSTOLIC BLOOD PRESSURE: 122 MMHG | TEMPERATURE: 97.5 F | HEIGHT: 74 IN | HEART RATE: 94 BPM | BODY MASS INDEX: 28.23 KG/M2 | RESPIRATION RATE: 16 BRPM | WEIGHT: 220 LBS

## 2018-08-10 LAB
ANION GAP SERPL CALC-SCNC: 8 MMOL/L (ref 3–11)
BASOPHILS # BLD AUTO: 0 10*3/UL
BASOPHILS NFR BLD AUTO: 0 % (ref 0–2)
BUN SERPL-MCNC: 12 MG/DL (ref 7–25)
CALCIUM SERPL-MCNC: 7.6 MG/DL (ref 8.6–10.3)
CHLORIDE SERPL-SCNC: 106 MMOL/L (ref 98–107)
CO2 SERPL-SCNC: 25 MMOL/L (ref 21–32)
CREAT SERPL-MCNC: 1.5 MG/DL (ref 0.8–1.3)
EOSINOPHIL # BLD AUTO: 0.2 10*3/UL
EOSINOPHIL NFR BLD AUTO: 3 % (ref 0–3)
ERYTHROCYTE [DISTWIDTH] IN BLOOD BY AUTOMATED COUNT: 13.1 % (ref 11.5–15)
GFR SERPL CREATININE-BSD FRML MDRD: 49 ML/MIN/1.73M*2
GLUCOSE SERPL-MCNC: 113 MG/DL (ref 70–105)
HCT VFR BLD AUTO: 35.1 % (ref 38–50)
HGB BLD-MCNC: 12 G/DL (ref 13.2–17.2)
LYMPHOCYTES # BLD AUTO: 1.2 10*3/UL
LYMPHOCYTES NFR BLD AUTO: 17 % (ref 15–47)
MCH RBC QN AUTO: 30.4 PG (ref 29–34)
MCHC RBC AUTO-ENTMCNC: 34.2 G/DL (ref 32–36)
MCV RBC AUTO: 88.9 FL (ref 82–97)
MONOCYTES # BLD AUTO: 0.8 10*3/UL
MONOCYTES NFR BLD AUTO: 11 % (ref 5–13)
NEUTROPHILS # BLD AUTO: 4.9 10*3/UL
NEUTROPHILS NFR BLD AUTO: 69 % (ref 46–70)
PLATELET # BLD AUTO: 201 10*3/UL (ref 130–350)
PMV BLD AUTO: 7.6 FL (ref 6.9–10.8)
POTASSIUM SERPL-SCNC: 4 MMOL/L (ref 3.5–5.1)
RBC # BLD AUTO: 3.95 10*6/ΜL (ref 4.1–5.8)
SODIUM SERPL-SCNC: 139 MMOL/L (ref 135–145)
WBC # BLD AUTO: 7 10*3/UL (ref 3.7–9.6)

## 2018-08-10 PROCEDURE — 6360000200 HC RX 636 W HCPCS (ALT 250 FOR IP): Performed by: ORTHOPAEDIC SURGERY

## 2018-08-10 PROCEDURE — 36415 COLL VENOUS BLD VENIPUNCTURE: CPT | Performed by: ORTHOPAEDIC SURGERY

## 2018-08-10 PROCEDURE — 2500000200 HC RX 250 WO HCPCS: Performed by: ORTHOPAEDIC SURGERY

## 2018-08-10 PROCEDURE — 80048 BASIC METABOLIC PNL TOTAL CA: CPT | Performed by: ORTHOPAEDIC SURGERY

## 2018-08-10 PROCEDURE — 6370000100 HC RX 637 (ALT 250 FOR IP): Performed by: ORTHOPAEDIC SURGERY

## 2018-08-10 PROCEDURE — 2590000100 HC RX 259: Performed by: ORTHOPAEDIC SURGERY

## 2018-08-10 PROCEDURE — 85025 COMPLETE CBC W/AUTO DIFF WBC: CPT | Performed by: ORTHOPAEDIC SURGERY

## 2018-08-10 RX ORDER — OXYCODONE HYDROCHLORIDE 10 MG/1
10-20 TABLET ORAL EVERY 4 HOURS PRN
Qty: 30 TABLET | Refills: 0 | Status: SHIPPED | OUTPATIENT
Start: 2018-08-10 | End: 2018-08-20

## 2018-08-10 RX ADMIN — KETOROLAC TROMETHAMINE 30 MG: 30 INJECTION, SOLUTION INTRAMUSCULAR at 06:30

## 2018-08-10 RX ADMIN — OXYCODONE HYDROCHLORIDE 5 MG: 5 TABLET ORAL at 04:15

## 2018-08-10 RX ADMIN — DOCUSATE SODIUM 100 MG: 100 CAPSULE, LIQUID FILLED ORAL at 14:26

## 2018-08-10 RX ADMIN — MORPHINE SULFATE 4 MG: 4 INJECTION, SOLUTION INTRAMUSCULAR; INTRAVENOUS at 06:47

## 2018-08-10 RX ADMIN — CEFAZOLIN SODIUM 2000 MG: 10 POWDER, FOR SOLUTION INTRAVENOUS at 06:30

## 2018-08-10 RX ADMIN — OXYCODONE HYDROCHLORIDE 5 MG: 5 TABLET ORAL at 14:26

## 2018-08-10 NOTE — PLAN OF CARE
Problem: Respiratory - Adult  Goal: Achieves optimal ventilation and oxygenation with noninvasive CPAP/BiLEVEL support  INTERVENTIONS:  1. Provide education to patient/family about rationale and expected outcomes associated with therapy  2. Position patient to facilitate optimal ventilation/oxygenation status and minimize respiratory effort  3. Position patient to reduce aspiration risk, elevate head of bed at least 35 degrees or higher, as applicable  4. Assess effectiveness of therapy on ventilation/oxygenation status based on oxygen saturation and/or arterial blood gases, as indicated  5. Assess patient for changes in respiratory and physiological status  6. Auscultate breath sounds and assess chest excursion, as indicated  7. Assess patient for changes in mentation and behavior  8. Routinely monitor equipment for proper performance and settings  9. Assess and monitor skin condition, in relationship to the respiratory interface  10. Assure equipment alarm volume is adequate for the patient's environment  11. Immediately respond to equipment alarm, to assess patient and/or cause for alarm  12. Follow universal infection control/hospital policy(ies)/standards  Outcome: Progressing   08/09/18 3819   Interventions Appropriate for this Patient   Achieves Optimal Oxygenation with Noninvasive CPAP/BiLEVEL Support Provide education to patient/family about rationale and expected outcomes associated with therapy;Position patient to reduce aspiration risk, elevate head of bed at least 35 degrees or higher, as applicable;Position patient to facilitate optimal ventilation/oxygenation status and minimize respiratory effort;Assess effectiveness of therapy on ventilation/oxygenation status based on oxygen saturation and/or arterial blood gases, as indicated;Assess patient for changes in respiratory and physiological status;Auscultate breath sounds and assess chest excursion, as indicated;Assess patient for changes in mentation  and behavior;Routinely monitor equipment for proper performance and settings;Assess and monitor skin condition, in relationship to the respiratory interface;Assure equipment alarm volume is adequate for the patient's environment;Immediately repsond to equipment alarm, to assess patient and/or cause for alarm;Follow universal infection control/hospital policy(ies)/standards

## 2018-08-10 NOTE — PROGRESS NOTES
Orthopedic Inpatient Progress Note      Status post-right Procedure(s) (LRB):  OPEN REDUCTION INTERNAL FIXATION HAND FOR MULTIPLE FRACTURE DISLOCATIONS (Right)    Pain Relief: some relief 5/10    Discharge today, Return to Clinic: prn    Activity: ad regina    Weight Bearing: WBAT     LOS: 1 day       Post-Operative Day: 1   Systemic or Specific Complaints: No Complaints    Objective     Vital signs in last 24 hours:  Temp:  [36.5 °C (97.7 °F)-37.7 °C (99.9 °F)] 36.9 °C (98.4 °F)  Heart rate:  [71-96] 75  Resp:  [14-20] 16  BP: (102-146)/(46-97) 133/86    Physical Exam  Physical Exam  Dressings and splint are clean dry and intact on the right hand neurocirculatory status is intact in the fingers with brisk capillary refill intact sensibility and good range of motion.    Data Review  CBC:  Results from last 7 days  Lab Units 08/08/18  2050   WBC AUTO 10*3/uL 14.8*   RBC AUTO 10*6/µL 4.67   HEMOGLOBIN g/dL 14.0   HEMATOCRIT % 42.0   PLATELETS AUTO 10*3/uL 301     BMP:  Lab Results   Component Value Date    GLUCOSE 124 (H) 08/08/2018    CALCIUM 9.0 08/08/2018     08/08/2018    K 3.7 08/08/2018    CO2 27 08/08/2018     08/08/2018    BUN 18 08/08/2018    CREATININE 1.1 08/08/2018    ANIONGAP 9 08/08/2018         Assessment/Plan   Assessment: Status post open reduction internal fixation multiple carpometacarpal fracture dislocations right hand.    Plan: Patient is anxious to return home.  He desires to be discharged today.  I think from an orthopedic standpoint is stable for discharge.  Patient reports she has an orthopedic surgeon available in [place].  I advised the patient will need to have his pins removed around 6 weeks.  He will need to have sutures removed in 2 weeks.  If he has any problems before he gets home he can give me a call.  We will give him a card with my office number and hospital number.    Clay Dow MD    Date: 8/10/2018  Time: 6:58 AM        A voice recognition program was used to aid in  documentation of this record. Sometimes words are not printed exactly as they were spoken.  While efforts were made to carefully edit and correct any inaccuracies, some errors may be present; please take these into context.  Please contact the provider's office if you have any questions or concerns.

## 2018-08-10 NOTE — PLAN OF CARE
Problem: Musculoskeletal - Adult  Goal: Maintain proper alignment of affected body part  INTERVENTIONS:  1. Support and protect limb and body alignment per provider's orders  2. Instruct and reinforce with patient and family use of appropriate assistive device and precautions (e.g. spinal or hip dislocation precautions)   Outcome: Progressing   08/09/18 2124   Interventions Appropriate for this Patient   Maintain proper alignment of affected body part Support and protect limb and body alignment per provider's orders;Instruct and reinforce with patient and family use of appropriate assistive device and precautions (e.g. spinal or hip dislocation precautions)     Goal: Return ADL status to a safe level of function  INTERVENTIONS:  1. Assess patient's ADL deficits and provide assistive devices as needed  2. Obtain PT/OT consults as needed  3. Assist and instruct patient to increase activity and self care   Outcome: Progressing   08/09/18 2124   Interventions Appropriate for this Patient   Return activities of daily living status to a safe level of function Assess patient's activities of daily living deficits and provide assistive devices as needed;Assist and instruct patient to increase activity and self care       Problem: Skin/Tissue Integrity - Adult  Goal: Skin integrity remains intact  INTERVENTIONS  1. Assess and document risk factors for pressure ulcer development  2. Assess and document skin integrity  3. Monitor for areas of redness and/or skin breakdown  4. Initiate pressure ulcer prevention measures as indicated   Outcome: Progressing   08/09/18 2124   Interventions Appropriate for this Patient   Skin integrity remains intact Assess and document risk factors for pressure ulcer development;Assess and document skin integrity;Monitor for areas of redness and/or skin breakdown     Goal: Incisions, wounds, or drain sites healing without S/S of infection  INTERVENTIONS  1. Assess and document risk factors for skin  breakdown  2. Assess and document skin integrity  3. Assess and document dressing/incision, wound bed, drain sites and surrounding tissue  4. Implement wound care per orders   Outcome: Progressing   08/09/18 2124   Interventions Appropriate for this Patient   Incision(s), wound(s) or drain site(s) healing without S/S of infection Assess and document risk factors for skin breakdown;Assess and document skin integrity;Assess and document dressing/incision, wound bed, drain sites and surrounding tissue       Problem: Pain - Adult  Goal: Verbalizes/displays adequate comfort level or baseline comfort level  INTERVENTIONS:  1. Encourage patient to monitor pain and request interventions  2. Assess pain using the appropriate pain scale  3. Administer analgesics based on type and severity of pain and evaluate response  4. Educate/Implement non-pharmacological measures as appropriate and evaluate response  5. Consider cultural, developmental and social influences on pain and pain management  6. Notify Provider if interventions unsuccessful or patient reports new pain   Outcome: Progressing   08/09/18 2124   Interventions Appropriate for this Patient   Verbalizes/displays adequate comfort level or baseline comfort level Encourage patient to monitor pain and request interventions;Assess pain using the appropriate pain scale;Administer analgesics based on type and severity of pain and evaluate response;Educate/Implement non-pharmacological measures as appropriate and evaluate response;Consider cultural, developmental and social influences on pain and pain management;Notify Provider if interventions unsuccessful or patient reports new pain       Problem: Safety Adult  Goal: Patient will remain safe during hospitalization  INTERVENTIONS    1. Assess patient for fall risk and implement interventions if needed  2. Use safe transport techniques  3. Assess patient using the Ralph skin assessment scale  4. Assess patient for risk of  aspiration  5. Assess patient for risk of elopement   Outcome: Progressing   08/09/18 2121   Interventions Appropriate for this Patient   Patient will remain safe durning hospitalization Assess patient for Fall Risk;Use safe transport;Assess Patient using the Ralph scale

## 2018-08-10 NOTE — NURSING END OF SHIFT
Nursing End of Shift Summary    Goals:  Clinical Goals for the Shift: Pain well controlled and rest comfortably.    Narrative Summary of Progress Towards Clinical Goals:  Pain was controlled with one dose Roxycodone and scheduled Toradol. Rested quietly most of the night with CPAP in place.     Barriers for Transfer or Discharge: yes   IV abx

## 2018-08-10 NOTE — NURSING END OF SHIFT
Nursing End of Shift Summary    Goals:  Clinical Goals for the Shift: Pain will be well controlled.    Narrative Summary of Progress Towards Clinical Goals:  Patient has had mild complaint of pain. Patient has scheduled Tordol. Non-pitting edema noted to left hand. Able to wiggle fingers freely. Cap refill < 2 seconds.    Barriers for Transfer or Discharge: Yes.
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
## 2023-04-14 ENCOUNTER — APPOINTMENT (OUTPATIENT)
Dept: INTERNAL MEDICINE | Facility: CLINIC | Age: 58
End: 2023-04-14

## 2023-04-14 PROBLEM — Z00.00 ENCOUNTER FOR PREVENTIVE HEALTH EXAMINATION: Status: ACTIVE | Noted: 2023-04-14

## (undated) DEVICE — SUTURE ETHILON 4-0 PS-2 18" BLACK

## (undated) DEVICE — COVER LIGHT HANDLE STERIS

## (undated) DEVICE — WATER STL IRR 500ML BTL USP PLASTIC POUR BOTTLE

## (undated) DEVICE — PAD BOVIE ADULT 9' CORD REM ELECTRODE PATIENT RETURN

## (undated) DEVICE — GLOVE SENSICARE 7.5 SURG

## (undated) DEVICE — BANDAGE ELASTIC VELCLOSE 3"5YD ACE WRAP

## (undated) DEVICE — CUP MEDICINE 2 OZ STL DISP

## (undated) DEVICE — GLOVE SENSICARE MICRO PF 8.0

## (undated) DEVICE — K-WIRE .045 X 9 INCH MICROAIRE DOUBLE TROCAR POINT

## (undated) DEVICE — GLOVE SENSICARE LT 6.5 SURG @

## (undated) DEVICE — BANDAGE ESMARCH 4"X 9' STL @

## (undated) DEVICE — NEEDLE HYPO 22G 1 1/2" SAFETY MONOJECT MAGELLAN

## (undated) DEVICE — SPONGE GAUZE 4X4-12 STL 10'S

## (undated) DEVICE — TRAY UPPER EXTREMITY CUST RCRH CUSTOM PACK

## (undated) DEVICE — BALLS JURGAN W062 PIN .062IN

## (undated) DEVICE — GAUZE DRESSING XEROFORM 1 X 8

## (undated) DEVICE — PREP SKIN DRY W/GLOVES TRAY

## (undated) DEVICE — GLOVE BIOGEL PI ORTHOPRO 7.5

## (undated) DEVICE — SUTURE VICRYL 2-0 SH

## (undated) DEVICE — SYRINGE 12CC LUER LOCK TIP CTL MONOJECT / RING CONTROL

## (undated) DEVICE — BALL JURGAN W045 PIN .045IN

## (undated) DEVICE — PADDNG CAST 3 INCH STERILE SHEETWADDING

## (undated) DEVICE — GLOVE GAMMEX SZ 8 STL GREEN POWDER FREE

## (undated) DEVICE — K-WIRE .062 X 9

## (undated) DEVICE — Device

## (undated) DEVICE — PACK C-ARM OEC MINIVEIW 6800 CONSUMABLES SN: AT-0115

## (undated) DEVICE — BIT DRILL MQC 2.0 65MM

## (undated) DEVICE — TOURNIQUET 18X4 1 PART QUICK 1 BLADDER DISP

## (undated) DEVICE — GOWN SURGICAL XL LEVEL 4